# Patient Record
Sex: FEMALE | Race: WHITE | NOT HISPANIC OR LATINO | URBAN - METROPOLITAN AREA
[De-identification: names, ages, dates, MRNs, and addresses within clinical notes are randomized per-mention and may not be internally consistent; named-entity substitution may affect disease eponyms.]

---

## 2019-03-12 ENCOUNTER — INPATIENT (INPATIENT)
Facility: HOSPITAL | Age: 69
LOS: 0 days | Discharge: ANOTHER HOSPITAL | DRG: 305 | End: 2019-03-13
Attending: INTERNAL MEDICINE | Admitting: INTERNAL MEDICINE
Payer: MEDICARE

## 2019-03-12 VITALS
RESPIRATION RATE: 18 BRPM | SYSTOLIC BLOOD PRESSURE: 138 MMHG | DIASTOLIC BLOOD PRESSURE: 99 MMHG | HEIGHT: 66 IN | TEMPERATURE: 98 F | HEART RATE: 87 BPM | OXYGEN SATURATION: 98 % | WEIGHT: 169.98 LBS

## 2019-03-12 DIAGNOSIS — Z98.890 OTHER SPECIFIED POSTPROCEDURAL STATES: Chronic | ICD-10-CM

## 2019-03-12 DIAGNOSIS — I21.4 NON-ST ELEVATION (NSTEMI) MYOCARDIAL INFARCTION: ICD-10-CM

## 2019-03-12 LAB
ALBUMIN SERPL ELPH-MCNC: 3.8 G/DL — SIGNIFICANT CHANGE UP (ref 3.5–5)
ALP SERPL-CCNC: 91 U/L — SIGNIFICANT CHANGE UP (ref 40–120)
ALT FLD-CCNC: 25 U/L DA — SIGNIFICANT CHANGE UP (ref 10–60)
ANION GAP SERPL CALC-SCNC: 6 MMOL/L — SIGNIFICANT CHANGE UP (ref 5–17)
APTT BLD: 27.6 SEC — SIGNIFICANT CHANGE UP (ref 27.5–36.3)
AST SERPL-CCNC: 19 U/L — SIGNIFICANT CHANGE UP (ref 10–40)
BASOPHILS # BLD AUTO: 0.05 K/UL — SIGNIFICANT CHANGE UP (ref 0–0.2)
BASOPHILS NFR BLD AUTO: 0.7 % — SIGNIFICANT CHANGE UP (ref 0–2)
BILIRUB SERPL-MCNC: 0.2 MG/DL — SIGNIFICANT CHANGE UP (ref 0.2–1.2)
BUN SERPL-MCNC: 22 MG/DL — HIGH (ref 7–18)
CALCIUM SERPL-MCNC: 8.8 MG/DL — SIGNIFICANT CHANGE UP (ref 8.4–10.5)
CHLORIDE SERPL-SCNC: 109 MMOL/L — HIGH (ref 96–108)
CO2 SERPL-SCNC: 27 MMOL/L — SIGNIFICANT CHANGE UP (ref 22–31)
CREAT SERPL-MCNC: 0.99 MG/DL — SIGNIFICANT CHANGE UP (ref 0.5–1.3)
EOSINOPHIL # BLD AUTO: 0.18 K/UL — SIGNIFICANT CHANGE UP (ref 0–0.5)
EOSINOPHIL NFR BLD AUTO: 2.4 % — SIGNIFICANT CHANGE UP (ref 0–6)
GLUCOSE SERPL-MCNC: 96 MG/DL — SIGNIFICANT CHANGE UP (ref 70–99)
HCT VFR BLD CALC: 44.8 % — SIGNIFICANT CHANGE UP (ref 34.5–45)
HGB BLD-MCNC: 14.8 G/DL — SIGNIFICANT CHANGE UP (ref 11.5–15.5)
IMM GRANULOCYTES NFR BLD AUTO: 0.3 % — SIGNIFICANT CHANGE UP (ref 0–1.5)
INR BLD: 1.02 RATIO — SIGNIFICANT CHANGE UP (ref 0.88–1.16)
LYMPHOCYTES # BLD AUTO: 3.05 K/UL — SIGNIFICANT CHANGE UP (ref 1–3.3)
LYMPHOCYTES # BLD AUTO: 40.5 % — SIGNIFICANT CHANGE UP (ref 13–44)
MCHC RBC-ENTMCNC: 32.3 PG — SIGNIFICANT CHANGE UP (ref 27–34)
MCHC RBC-ENTMCNC: 33 GM/DL — SIGNIFICANT CHANGE UP (ref 32–36)
MCV RBC AUTO: 97.8 FL — SIGNIFICANT CHANGE UP (ref 80–100)
MONOCYTES # BLD AUTO: 0.66 K/UL — SIGNIFICANT CHANGE UP (ref 0–0.9)
MONOCYTES NFR BLD AUTO: 8.8 % — SIGNIFICANT CHANGE UP (ref 2–14)
NEUTROPHILS # BLD AUTO: 3.58 K/UL — SIGNIFICANT CHANGE UP (ref 1.8–7.4)
NEUTROPHILS NFR BLD AUTO: 47.3 % — SIGNIFICANT CHANGE UP (ref 43–77)
NRBC # BLD: 0 /100 WBCS — SIGNIFICANT CHANGE UP (ref 0–0)
PLATELET # BLD AUTO: 235 K/UL — SIGNIFICANT CHANGE UP (ref 150–400)
POTASSIUM SERPL-MCNC: 4.4 MMOL/L — SIGNIFICANT CHANGE UP (ref 3.5–5.3)
POTASSIUM SERPL-SCNC: 4.4 MMOL/L — SIGNIFICANT CHANGE UP (ref 3.5–5.3)
PROT SERPL-MCNC: 7.7 G/DL — SIGNIFICANT CHANGE UP (ref 6–8.3)
PROTHROM AB SERPL-ACNC: 11.3 SEC — SIGNIFICANT CHANGE UP (ref 10–12.9)
RBC # BLD: 4.58 M/UL — SIGNIFICANT CHANGE UP (ref 3.8–5.2)
RBC # FLD: 13.1 % — SIGNIFICANT CHANGE UP (ref 10.3–14.5)
SODIUM SERPL-SCNC: 142 MMOL/L — SIGNIFICANT CHANGE UP (ref 135–145)
TROPONIN I SERPL-MCNC: 0.81 NG/ML — HIGH (ref 0–0.04)
WBC # BLD: 7.54 K/UL — SIGNIFICANT CHANGE UP (ref 3.8–10.5)
WBC # FLD AUTO: 7.54 K/UL — SIGNIFICANT CHANGE UP (ref 3.8–10.5)

## 2019-03-12 PROCEDURE — 71046 X-RAY EXAM CHEST 2 VIEWS: CPT | Mod: 26

## 2019-03-12 PROCEDURE — 99285 EMERGENCY DEPT VISIT HI MDM: CPT

## 2019-03-12 RX ORDER — ASPIRIN/CALCIUM CARB/MAGNESIUM 324 MG
81 TABLET ORAL DAILY
Qty: 0 | Refills: 0 | Status: DISCONTINUED | OUTPATIENT
Start: 2019-03-12 | End: 2019-03-13

## 2019-03-12 RX ORDER — METOPROLOL TARTRATE 50 MG
12.5 TABLET ORAL
Qty: 0 | Refills: 0 | Status: DISCONTINUED | OUTPATIENT
Start: 2019-03-12 | End: 2019-03-13

## 2019-03-12 RX ORDER — SODIUM CHLORIDE 9 MG/ML
1000 INJECTION INTRAMUSCULAR; INTRAVENOUS; SUBCUTANEOUS
Qty: 0 | Refills: 0 | Status: COMPLETED | OUTPATIENT
Start: 2019-03-12 | End: 2019-03-12

## 2019-03-12 RX ORDER — ATORVASTATIN CALCIUM 80 MG/1
40 TABLET, FILM COATED ORAL AT BEDTIME
Qty: 0 | Refills: 0 | Status: DISCONTINUED | OUTPATIENT
Start: 2019-03-12 | End: 2019-03-13

## 2019-03-12 RX ORDER — ENOXAPARIN SODIUM 100 MG/ML
80 INJECTION SUBCUTANEOUS ONCE
Qty: 0 | Refills: 0 | Status: COMPLETED | OUTPATIENT
Start: 2019-03-12 | End: 2019-03-12

## 2019-03-12 RX ORDER — ASPIRIN/CALCIUM CARB/MAGNESIUM 324 MG
325 TABLET ORAL ONCE
Qty: 0 | Refills: 0 | Status: COMPLETED | OUTPATIENT
Start: 2019-03-12 | End: 2019-03-12

## 2019-03-12 RX ADMIN — ENOXAPARIN SODIUM 80 MILLIGRAM(S): 100 INJECTION SUBCUTANEOUS at 23:02

## 2019-03-12 RX ADMIN — ATORVASTATIN CALCIUM 40 MILLIGRAM(S): 80 TABLET, FILM COATED ORAL at 21:36

## 2019-03-12 RX ADMIN — Medication 325 MILLIGRAM(S): at 18:44

## 2019-03-12 RX ADMIN — SODIUM CHLORIDE 100 MILLILITER(S): 9 INJECTION INTRAMUSCULAR; INTRAVENOUS; SUBCUTANEOUS at 21:49

## 2019-03-12 NOTE — H&P ADULT - NSHPPHYSICALEXAM_GEN_ALL_CORE
Vital Signs Last 24 Hrs  T(C): 36.8 (12 Mar 2019 20:30), Max: 36.8 (12 Mar 2019 20:30)  T(F): 98.3 (12 Mar 2019 20:30), Max: 98.3 (12 Mar 2019 20:30)  HR: 65 (12 Mar 2019 20:30) (65 - 87)  BP: 130/71 (12 Mar 2019 20:30) (130/71 - 138/99)  RR: 18 (12 Mar 2019 20:30) (18 - 18)  SpO2: 97% (12 Mar 2019 20:30) (97% - 98%)

## 2019-03-12 NOTE — ED PROVIDER NOTE - PROGRESS NOTE DETAILS
patient trop +, endorses no cp at this time. vital stable. ekg wnl. admitted to dr willson for nstemi

## 2019-03-12 NOTE — H&P ADULT - NSICDXPROBLEM_GEN_ALL_CORE_FT
PROBLEM DIAGNOSES  Problem: Non-ST elevation MI (NSTEMI)  Assessment and Plan: Patient presented with  left side chest pain radiating to left arm along with SOB and exertional dyspnea   - on admission pt was afebrile, HD stable, no leucocytosis, normal electrolytes and renal functions  - CXR showed no congestion or consolidation   - Troponin T1 0.8, D dimer neg   -Ekg NSR with no st t wave changes   - trend trop   -F/u BNP, ECHO   - F/u CT chest   - s/p 80 U lovenox   - continue with aspirin, lipitor and BB  - f/u TSH, lipid panel and A1c   - cardilogy consult Dr Salmeron     Problem: HLD (hyperlipidemia)  Assessment and Plan:     Problem: Prophylactic measure  Assessment and Plan: VTE score 2   lovenox for Prophylaxis

## 2019-03-12 NOTE — ED PROVIDER NOTE - CLINICAL SUMMARY MEDICAL DECISION MAKING FREE TEXT BOX
67 y/o F presents to the ED with intermittent L-sided chest pain x 2 days. Otherwise vitals are stable, pt has low risk for PE, and has a low HEART score except for age. Will check labs, chest xray, and reassess.

## 2019-03-12 NOTE — H&P ADULT - MUSCULOSKELETAL
detailed exam ROM intact/no joint swelling/no joint erythema/no joint warmth/no calf tenderness/normal strength

## 2019-03-12 NOTE — H&P ADULT - ATTENDING COMMENTS
seen and examined  came for chest pain left side  radoiating to lt arm,  x 2 days , but constant, not related with exertion no sob or palp   pt never had this cp before  ,  not going to back   no h/o htn,dm.  h/o HLD, smoking.  no f/h of cad  no h/o travel, no leg pain    she has exertional dyspnea for  a while  vsstable  bp, pulse, rr , temp neted ok  no chest tenderness,   b/l pulses equivocal  cns non focal  labs noted   ekg nsr , cxr negative  trop 0.8  d dimer borderline  a/p r/o coronary event  lovenox, asa, metoprolol  card, troponon, telem  simvastatin  no evidence of PE or Aortic disection   heavy smoker  will get ct scan of chest

## 2019-03-12 NOTE — ED ADULT NURSE NOTE - OBJECTIVE STATEMENT
pt complains of on and off chest pain since Sunday. pt says she has sob when pain begins but that she dose not have any pain at the moment.

## 2019-03-12 NOTE — H&P ADULT - ASSESSMENT
Patient is a 68 year old female from home with PMHx of HLD, active smoker, vaginal cancer s/p surgery in 1996, presented to ED with C/o of chest pain since 2 days. As per patient she started having chest pain on Sunday, progressively getting worse. Patient describes chest pain as pressure like, 5/10 in intensity, located left side of chest, radiating to left arm no alleviating or relieving factors. patient also repots of dry cough, SOB and exertional dyspnea, Patient is active smoker, 1 PPD for 50 years. she denies fever, chills, headaches, palpitation, dizziness, sweating, nausea, vomiting, diarrhea, abdominal pain, dysuria, skin rashes, muscle or joint pain. Patient denies any H/o CVD or recent travel. Patient is a 68 year old female from home with PMHx of HLD, active smoker, vaginal cancer s/p surgery in 1996, presented to ED with C/o of chest pain since 2 days. As per patient she started having chest pain on Sunday, progressively getting worse. Patient describes chest pain as pressure like, 5/10 in intensity, located left side of chest, radiating to left arm no alleviating or relieving factors. patient also repots of dry cough, SOB and exertional dyspnea, Patient is active smoker, 1 PPD for 50 years. she denies fever, chills, headaches, palpitation, dizziness, sweating, nausea, vomiting, diarrhea, abdominal pain, dysuria, skin rashes, muscle or joint pain. Patient denies any H/o CVD or recent travel.     on admission pt was afebrile, HD stable, no leucocytosis, normal electrolytes and renal functions  - CXR showed no congestion or consolidation   - Troponin T1 0.8  -Ekg NSR with no st t wave changes

## 2019-03-12 NOTE — H&P ADULT - HISTORY OF PRESENT ILLNESS
Patient is a 68 year old female from home with PMHx of HLD, active smoker, vaginal cancer s/p surgery in 1996, presented to ED with C/o of chest pain since 2 days. As per patient she started having chest pain on Sunday, progressively getting worse. Patient describes chest pain as pressure like, 5/10 in intensity, located left side of chest, radiating to left arm no alleviating or relieving factors. patient also repots of dry cough, SOB and exertional dyspnea, Patient is active smoker, 1 PPD for 50 years. she denies fever, chills, headaches, palpitation, dizziness, sweating, nausea, vomiting, diarrhea, abdominal pain, dysuria, skin rashes, muscle or joint pain. Patient denies any H/o CVD or recent travel.

## 2019-03-12 NOTE — H&P ADULT - RS GEN PE MLT RESP DETAILS PC
no wheezes/no rhonchi/no rales/good air movement/breath sounds equal/no chest wall tenderness/respirations non-labored

## 2019-03-12 NOTE — ED PROVIDER NOTE - OBJECTIVE STATEMENT
69 y/o F with no significant PMHx and no significant PSHx presents to the ED with c/o intermittent L-sided chest pain x 2 days that has since resolved. Pt states he has no past medical problems and no hx of smoking. Pt denies SOB, recent travel, recent sick contacts, calf pain or swelling, fevers, chills, cough, or any other complaints. NKDA.

## 2019-03-13 ENCOUNTER — INPATIENT (INPATIENT)
Facility: HOSPITAL | Age: 69
LOS: 0 days | Discharge: HOME | End: 2019-03-14
Attending: INTERNAL MEDICINE | Admitting: INTERNAL MEDICINE
Payer: SELF-PAY

## 2019-03-13 VITALS
RESPIRATION RATE: 17 BRPM | HEART RATE: 68 BPM | SYSTOLIC BLOOD PRESSURE: 145 MMHG | TEMPERATURE: 99 F | OXYGEN SATURATION: 96 % | DIASTOLIC BLOOD PRESSURE: 78 MMHG

## 2019-03-13 VITALS
WEIGHT: 164.69 LBS | HEIGHT: 63 IN | HEART RATE: 75 BPM | SYSTOLIC BLOOD PRESSURE: 138 MMHG | DIASTOLIC BLOOD PRESSURE: 76 MMHG | OXYGEN SATURATION: 97 % | RESPIRATION RATE: 16 BRPM | TEMPERATURE: 98 F

## 2019-03-13 DIAGNOSIS — I21.4 NON-ST ELEVATION (NSTEMI) MYOCARDIAL INFARCTION: ICD-10-CM

## 2019-03-13 DIAGNOSIS — E78.5 HYPERLIPIDEMIA, UNSPECIFIED: ICD-10-CM

## 2019-03-13 DIAGNOSIS — Z98.890 OTHER SPECIFIED POSTPROCEDURAL STATES: Chronic | ICD-10-CM

## 2019-03-13 DIAGNOSIS — Z29.9 ENCOUNTER FOR PROPHYLACTIC MEASURES, UNSPECIFIED: ICD-10-CM

## 2019-03-13 LAB
ANION GAP SERPL CALC-SCNC: 5 MMOL/L — SIGNIFICANT CHANGE UP (ref 5–17)
BASOPHILS # BLD AUTO: 0.06 K/UL — SIGNIFICANT CHANGE UP (ref 0–0.2)
BASOPHILS NFR BLD AUTO: 0.8 % — SIGNIFICANT CHANGE UP (ref 0–2)
BUN SERPL-MCNC: 16 MG/DL — SIGNIFICANT CHANGE UP (ref 7–18)
CALCIUM SERPL-MCNC: 8.4 MG/DL — SIGNIFICANT CHANGE UP (ref 8.4–10.5)
CHLORIDE SERPL-SCNC: 110 MMOL/L — HIGH (ref 96–108)
CHOLEST SERPL-MCNC: 241 MG/DL — HIGH (ref 10–199)
CK MB BLD-MCNC: 3.4 % — SIGNIFICANT CHANGE UP (ref 0–3.5)
CK MB BLD-MCNC: 3.8 % — HIGH (ref 0–3.5)
CK MB BLD-MCNC: 8 NG/ML — HIGH (ref 1–4.7)
CK MB BLD-MCNC: SIGNIFICANT CHANGE UP (ref 0–2.5)
CK MB CFR SERPL CALC: 2.6 NG/ML — SIGNIFICANT CHANGE UP (ref 0–3.6)
CK MB CFR SERPL CALC: 2.9 NG/ML — SIGNIFICANT CHANGE UP (ref 0–3.6)
CK SERPL-CCNC: 113 U/L — SIGNIFICANT CHANGE UP (ref 25–170)
CK SERPL-CCNC: 76 U/L — SIGNIFICANT CHANGE UP (ref 21–215)
CK SERPL-CCNC: 77 U/L — SIGNIFICANT CHANGE UP (ref 21–215)
CO2 SERPL-SCNC: 23 MMOL/L — SIGNIFICANT CHANGE UP (ref 22–31)
CREAT SERPL-MCNC: 0.81 MG/DL — SIGNIFICANT CHANGE UP (ref 0.5–1.3)
EOSINOPHIL # BLD AUTO: 0.21 K/UL — SIGNIFICANT CHANGE UP (ref 0–0.5)
EOSINOPHIL NFR BLD AUTO: 2.8 % — SIGNIFICANT CHANGE UP (ref 0–6)
GLUCOSE SERPL-MCNC: 89 MG/DL — SIGNIFICANT CHANGE UP (ref 70–99)
HBA1C BLD-MCNC: 5.8 % — HIGH (ref 4–5.6)
HCT VFR BLD CALC: 43.7 % — SIGNIFICANT CHANGE UP (ref 34.5–45)
HCT VFR BLD CALC: 46.6 % — HIGH (ref 34.5–45)
HCV AB S/CO SERPL IA: 0.32 S/CO — SIGNIFICANT CHANGE UP (ref 0–0.79)
HCV AB SERPL-IMP: SIGNIFICANT CHANGE UP
HDLC SERPL-MCNC: 35 MG/DL — LOW
HGB BLD-MCNC: 14.8 G/DL — SIGNIFICANT CHANGE UP (ref 11.5–15.5)
HGB BLD-MCNC: 15.3 G/DL — SIGNIFICANT CHANGE UP (ref 11.5–15.5)
IMM GRANULOCYTES NFR BLD AUTO: 0.3 % — SIGNIFICANT CHANGE UP (ref 0–1.5)
LIPID PNL WITH DIRECT LDL SERPL: 166 MG/DL — SIGNIFICANT CHANGE UP
LYMPHOCYTES # BLD AUTO: 3.19 K/UL — SIGNIFICANT CHANGE UP (ref 1–3.3)
LYMPHOCYTES # BLD AUTO: 42.8 % — SIGNIFICANT CHANGE UP (ref 13–44)
MAGNESIUM SERPL-MCNC: 2.2 MG/DL — SIGNIFICANT CHANGE UP (ref 1.6–2.6)
MCHC RBC-ENTMCNC: 31.7 PG — SIGNIFICANT CHANGE UP (ref 27–34)
MCHC RBC-ENTMCNC: 32.7 PG — SIGNIFICANT CHANGE UP (ref 27–34)
MCHC RBC-ENTMCNC: 32.8 % — SIGNIFICANT CHANGE UP (ref 32–36)
MCHC RBC-ENTMCNC: 33.9 GM/DL — SIGNIFICANT CHANGE UP (ref 32–36)
MCV RBC AUTO: 96.5 FL — SIGNIFICANT CHANGE UP (ref 80–100)
MCV RBC AUTO: 96.5 FL — SIGNIFICANT CHANGE UP (ref 80–100)
MONOCYTES # BLD AUTO: 0.62 K/UL — SIGNIFICANT CHANGE UP (ref 0–0.9)
MONOCYTES NFR BLD AUTO: 8.3 % — SIGNIFICANT CHANGE UP (ref 2–14)
NEUTROPHILS # BLD AUTO: 3.35 K/UL — SIGNIFICANT CHANGE UP (ref 1.8–7.4)
NEUTROPHILS NFR BLD AUTO: 45 % — SIGNIFICANT CHANGE UP (ref 43–77)
NRBC # BLD: 0 /100 WBCS — SIGNIFICANT CHANGE UP (ref 0–0)
NRBC # FLD: 0 K/UL — LOW (ref 25–125)
PHOSPHATE SERPL-MCNC: 3.1 MG/DL — SIGNIFICANT CHANGE UP (ref 2.5–4.5)
PLATELET # BLD AUTO: 228 K/UL — SIGNIFICANT CHANGE UP (ref 150–400)
PLATELET # BLD AUTO: 239 K/UL — SIGNIFICANT CHANGE UP (ref 150–400)
PMV BLD: 10.7 FL — SIGNIFICANT CHANGE UP (ref 7–13)
POTASSIUM SERPL-MCNC: 3.8 MMOL/L — SIGNIFICANT CHANGE UP (ref 3.5–5.3)
POTASSIUM SERPL-SCNC: 3.8 MMOL/L — SIGNIFICANT CHANGE UP (ref 3.5–5.3)
RBC # BLD: 4.53 M/UL — SIGNIFICANT CHANGE UP (ref 3.8–5.2)
RBC # BLD: 4.83 M/UL — SIGNIFICANT CHANGE UP (ref 3.8–5.2)
RBC # FLD: 12.9 % — SIGNIFICANT CHANGE UP (ref 10.3–14.5)
RBC # FLD: 13.2 % — SIGNIFICANT CHANGE UP (ref 10.3–14.5)
SODIUM SERPL-SCNC: 138 MMOL/L — SIGNIFICANT CHANGE UP (ref 135–145)
TOTAL CHOLESTEROL/HDL RATIO MEASUREMENT: 6.9 RATIO — SIGNIFICANT CHANGE UP (ref 3.3–7.1)
TRIGL SERPL-MCNC: 200 MG/DL — HIGH (ref 10–149)
TROPONIN I SERPL-MCNC: 1.04 NG/ML — HIGH (ref 0–0.04)
TROPONIN I SERPL-MCNC: 1.13 NG/ML — HIGH (ref 0–0.04)
TROPONIN T, HIGH SENSITIVITY: 366 NG/L — CRITICAL HIGH (ref ?–14)
TSH SERPL-MCNC: 0.52 UU/ML — SIGNIFICANT CHANGE UP (ref 0.34–4.82)
WBC # BLD: 7.45 K/UL — SIGNIFICANT CHANGE UP (ref 3.8–10.5)
WBC # BLD: 8.9 K/UL — SIGNIFICANT CHANGE UP (ref 3.8–10.5)
WBC # FLD AUTO: 7.45 K/UL — SIGNIFICANT CHANGE UP (ref 3.8–10.5)
WBC # FLD AUTO: 8.9 K/UL — SIGNIFICANT CHANGE UP (ref 3.8–10.5)

## 2019-03-13 PROCEDURE — 80061 LIPID PANEL: CPT

## 2019-03-13 PROCEDURE — 93458 L HRT ARTERY/VENTRICLE ANGIO: CPT | Mod: 26,59

## 2019-03-13 PROCEDURE — 82550 ASSAY OF CK (CPK): CPT

## 2019-03-13 PROCEDURE — 86803 HEPATITIS C AB TEST: CPT

## 2019-03-13 PROCEDURE — 82553 CREATINE MB FRACTION: CPT

## 2019-03-13 PROCEDURE — 85027 COMPLETE CBC AUTOMATED: CPT

## 2019-03-13 PROCEDURE — 83036 HEMOGLOBIN GLYCOSYLATED A1C: CPT

## 2019-03-13 PROCEDURE — 92928 PRQ TCAT PLMT NTRAC ST 1 LES: CPT | Mod: RC

## 2019-03-13 PROCEDURE — 83735 ASSAY OF MAGNESIUM: CPT

## 2019-03-13 PROCEDURE — 85379 FIBRIN DEGRADATION QUANT: CPT

## 2019-03-13 PROCEDURE — 80048 BASIC METABOLIC PNL TOTAL CA: CPT

## 2019-03-13 PROCEDURE — 85730 THROMBOPLASTIN TIME PARTIAL: CPT

## 2019-03-13 PROCEDURE — 92978 ENDOLUMINL IVUS OCT C 1ST: CPT | Mod: 26,RC

## 2019-03-13 PROCEDURE — 80053 COMPREHEN METABOLIC PANEL: CPT

## 2019-03-13 PROCEDURE — 84484 ASSAY OF TROPONIN QUANT: CPT

## 2019-03-13 PROCEDURE — 99285 EMERGENCY DEPT VISIT HI MDM: CPT | Mod: 25

## 2019-03-13 PROCEDURE — 84443 ASSAY THYROID STIM HORMONE: CPT

## 2019-03-13 PROCEDURE — 93010 ELECTROCARDIOGRAM REPORT: CPT

## 2019-03-13 PROCEDURE — 99152 MOD SED SAME PHYS/QHP 5/>YRS: CPT

## 2019-03-13 PROCEDURE — 93005 ELECTROCARDIOGRAM TRACING: CPT

## 2019-03-13 PROCEDURE — 84100 ASSAY OF PHOSPHORUS: CPT

## 2019-03-13 PROCEDURE — 71046 X-RAY EXAM CHEST 2 VIEWS: CPT

## 2019-03-13 PROCEDURE — 36415 COLL VENOUS BLD VENIPUNCTURE: CPT

## 2019-03-13 PROCEDURE — 85610 PROTHROMBIN TIME: CPT

## 2019-03-13 RX ORDER — METOPROLOL TARTRATE 50 MG
12.5 TABLET ORAL
Qty: 0 | Refills: 0 | Status: DISCONTINUED | OUTPATIENT
Start: 2019-03-13 | End: 2019-03-14

## 2019-03-13 RX ORDER — NICOTINE POLACRILEX 2 MG
1 GUM BUCCAL DAILY
Qty: 0 | Refills: 0 | Status: DISCONTINUED | OUTPATIENT
Start: 2019-03-13 | End: 2019-03-14

## 2019-03-13 RX ORDER — TICAGRELOR 90 MG/1
90 TABLET ORAL
Qty: 0 | Refills: 0 | Status: DISCONTINUED | OUTPATIENT
Start: 2019-03-14 | End: 2019-03-14

## 2019-03-13 RX ORDER — ATORVASTATIN CALCIUM 80 MG/1
80 TABLET, FILM COATED ORAL AT BEDTIME
Qty: 0 | Refills: 0 | Status: DISCONTINUED | OUTPATIENT
Start: 2019-03-13 | End: 2019-03-14

## 2019-03-13 RX ORDER — ACETAMINOPHEN 500 MG
650 TABLET ORAL ONCE
Qty: 0 | Refills: 0 | Status: COMPLETED | OUTPATIENT
Start: 2019-03-13 | End: 2019-03-13

## 2019-03-13 RX ORDER — ASPIRIN/CALCIUM CARB/MAGNESIUM 324 MG
81 TABLET ORAL DAILY
Qty: 0 | Refills: 0 | Status: DISCONTINUED | OUTPATIENT
Start: 2019-03-14 | End: 2019-03-14

## 2019-03-13 RX ORDER — SODIUM CHLORIDE 9 MG/ML
3 INJECTION INTRAMUSCULAR; INTRAVENOUS; SUBCUTANEOUS EVERY 8 HOURS
Qty: 0 | Refills: 0 | Status: DISCONTINUED | OUTPATIENT
Start: 2019-03-13 | End: 2019-03-14

## 2019-03-13 RX ORDER — EPTIFIBATIDE 2 MG/ML
2 INJECTION, SOLUTION INTRAVENOUS
Qty: 75 | Refills: 0 | Status: DISCONTINUED | OUTPATIENT
Start: 2019-03-13 | End: 2019-03-14

## 2019-03-13 RX ORDER — LANOLIN ALCOHOL/MO/W.PET/CERES
3 CREAM (GRAM) TOPICAL ONCE
Qty: 0 | Refills: 0 | Status: COMPLETED | OUTPATIENT
Start: 2019-03-13 | End: 2019-03-13

## 2019-03-13 RX ADMIN — EPTIFIBATIDE 12.8 MICROGRAM(S)/KG/MIN: 2 INJECTION, SOLUTION INTRAVENOUS at 21:06

## 2019-03-13 RX ADMIN — SODIUM CHLORIDE 3 MILLILITER(S): 9 INJECTION INTRAMUSCULAR; INTRAVENOUS; SUBCUTANEOUS at 21:00

## 2019-03-13 RX ADMIN — Medication 3 MILLIGRAM(S): at 21:27

## 2019-03-13 RX ADMIN — Medication 12.5 MILLIGRAM(S): at 21:05

## 2019-03-13 RX ADMIN — Medication 650 MILLIGRAM(S): at 21:06

## 2019-03-13 RX ADMIN — Medication 1 PATCH: at 21:08

## 2019-03-13 RX ADMIN — ATORVASTATIN CALCIUM 80 MILLIGRAM(S): 80 TABLET, FILM COATED ORAL at 21:06

## 2019-03-13 RX ADMIN — Medication 12.5 MILLIGRAM(S): at 06:57

## 2019-03-13 RX ADMIN — Medication 81 MILLIGRAM(S): at 11:10

## 2019-03-13 RX ADMIN — EPTIFIBATIDE 12.8 MICROGRAM(S)/KG/MIN: 2 INJECTION, SOLUTION INTRAVENOUS at 15:12

## 2019-03-13 RX ADMIN — Medication 650 MILLIGRAM(S): at 22:00

## 2019-03-13 RX ADMIN — EPTIFIBATIDE 12.8 MICROGRAM(S)/KG/MIN: 2 INJECTION, SOLUTION INTRAVENOUS at 15:11

## 2019-03-13 RX ADMIN — EPTIFIBATIDE 12.8 MICROGRAM(S)/KG/MIN: 2 INJECTION, SOLUTION INTRAVENOUS at 15:10

## 2019-03-13 NOTE — TRANSFER ACCEPTANCE NOTE - NEGATIVE NEUROLOGICAL SYMPTOMS
no headache/no difficulty walking/no loss of consciousness/no facial palsy/no hemiparesis/no transient paralysis/no weakness/no syncope/no tremors/no focal seizures/no vertigo/no loss of sensation/no paresthesias/no generalized seizures/no confusion

## 2019-03-13 NOTE — ACUTE INTERFACILITY TRANSFER NOTE - HOSPITAL COURSE
Patient is a 68 year old female from home with PMHx of HLD, active smoker, vaginal cancer s/p surgery in 1996, presented to ED with C/o of chest pain since 2 days.Patient is active smoker, 1 PPD for 50 years.   On admission pt was afebrile, HD stable, no leucocytosis, normal electrolytes and renal functions  - CXR showed no congestion or consolidation   -Ekg NSR with no st t wave changes   CE trended up 0.8-->1.130-->1.040   D dimer -ve   S/p lovenox 80 mg x1   Cholesterol and TG high     Pt will be transferred for cardiac cath to [facility]

## 2019-03-13 NOTE — PROGRESS NOTE ADULT - SUBJECTIVE AND OBJECTIVE BOX
Right radial band removed. No hematoma or active bleeding   + Pulse DSD applied VSS.  Continue Integrilin gtt 18 hrs

## 2019-03-13 NOTE — CONSULT NOTE ADULT - ASSESSMENT
69 y/o F w/ no known PMH not on any meds, active smoker, and vaginal cancer s/p surgery in 1996 presented to [facility] ED with c/o of chest pain. Admitted for NSTEMI s/p cardiac cath with STEPHON to RCA.     #NSTEMI s/p STEPHON to RCA w/ heavy thrombus burden.   - Start ASA and Brilinta.   - Start Atorvastatin 80 mg daily  - C/w integrelin till 10 AM on 3/14/19  - Monitor Platelets.   - Check TTE    Heparin SQ for DVT ppx.     Nimisha Zuniga MD  Cardiology Fellow - PGY-4  [facility]: [id number]  NS: [phone]  [id number]

## 2019-03-13 NOTE — TRANSFER ACCEPTANCE NOTE - RS GEN PE MLT RESP DETAILS PC
respirations non-labored/breath sounds equal/no chest wall tenderness/airway patent/good air movement/clear to auscultation bilaterally

## 2019-03-13 NOTE — TRANSFER ACCEPTANCE NOTE - HISTORY OF PRESENT ILLNESS
67 y/o F w/ PMH HLD, active smoker and vaginal cancer s/p surgery in 1996 presented to [facility] ED with c/o of chest pain. As per patient she started having chest pain on Sunday, progressively getting worse. Patient describes chest pain as pressure like, 5/10 in intensity, located left side of chest, radiating to left arm no alleviating or relieving factors. Patient also reports of dry cough, SOB and exertional dyspnea, Patient is active smoker, 1 PPD for 50 years. Pt was found to have elevated troponins which trended 0.808 -> 1.13 -> 1.04. Pt is now transferred to [facility] for cardiac catheretization.

## 2019-03-13 NOTE — TRANSFER ACCEPTANCE NOTE - NEGATIVE MUSCULOSKELETAL SYMPTOMS
no muscle cramps/no muscle weakness/no arthralgia/no arthritis/no back pain/no joint swelling/no myalgia/no neck pain/no stiffness

## 2019-03-13 NOTE — CONSULT NOTE ADULT - SUBJECTIVE AND OBJECTIVE BOX
Patient seen and evaluated at bedside    Chief Complaint: Chest Pain.     HPI:  67 y/o F w/ no known PMH not on any meds, active smoker, and vaginal cancer s/p surgery in  presented to [facility] ED with c/o of chest pain. As per patient she started having chest pain on , progressively getting worse. Patient describes chest pain as pressure like, 5/10 in intensity, located left side of chest, radiating to left arm no alleviating or relieving factors. PPD for 50 years. Pt was found to have elevated troponin which trended 0.808 -> 1.13 -> 1.04. The pt was transferred to [facility] for cardiac catheretization. The patient was found to have a 99% RCA lesion s/p STEPHON x 1. There was heavy thrombus burden as well as possible narrowing of an acute marginal so the patient was started on an integrelin gtt and transferred to the CCU.       PMHx:   Smoker  Vaginal cancer  HLD (hyperlipidemia)  No pertinent past medical history      PSHx:   H/O vaginal surgery  No significant past surgical history      Allergies:  No Known Allergies      Home Meds: None     Current Medications:   atorvastatin 80 milliGRAM(s) Oral at bedtime  metoprolol tartrate 12.5 milliGRAM(s) Oral two times a day  sodium chloride 0.9% lock flush 3 milliLiter(s) IV Push every 8 hours      FAMILY HISTORY: Sister with high BP. Mom day and son are dead.       Social History: Lives with sister.   Smoking History: Current daily smoker   Alcohol Use: none  Drug Use: none     REVIEW OF SYSTEMS:  Constitutional:     [ ] negative [ ] fevers [ ] chills [ ] weight loss [ ] weight gain  HEENT:                  [ ] negative [ ] dry eyes [ ] eye irritation [ ] postnasal drip [ ] nasal congestion  CV:                         [ ] negative  [ x] chest pain [ ] orthopnea [ ] palpitations [ ] murmur  Resp:                     [ ] negative [ ] cough [ ] shortness of breath [ ] dyspnea [ ] wheezing [ ] sputum [ ]hemoptysis  GI:                          [ ] negative [ ] nausea [ ] vomiting [ ] diarrhea [ ] constipation [ ] abd pain [ ] dysphagia   :                        [ ] negative [ ] dysuria [ ] nocturia [ ] hematuria [ ] increased urinary frequency  Musculoskeletal: [ ] negative [ ] back pain [ ] myalgias [ ] arthralgias [ ] fracture  Skin:                       [ ] negative [ ] rash [ ] itch  Neurological:        [ ] negative [ ] headache [ ] dizziness [ ] syncope [ ] weakness [ ] numbness  Psychiatric:           [ ] negative [ ] anxiety [ ] depression  Endocrine:            [ ] negative [ ] diabetes [ ] thyroid problem  Heme/Lymph:      [ ] negative [ ] anemia [ ] bleeding problem  Allergic/Immune: [ ] negative [ ] itchy eyes [ ] nasal discharge [ ] hives [ ] angioedema    [x ] All other systems negative  [ ] Unable to assess ROS because sedated with anoxic brain injury.      Physical Exam:  T(F): 98.6 (), Max: 98.6 ()  HR: 68 () (54 - 87)  BP: 145/78 () (104/62 - 145/78)  RR: 17 ()  SpO2: 96% ()  GENERAL: No acute distress, well-developed  HEAD:  Atraumatic, Normocephalic  ENT: EOMI, PERRLA, conjunctiva and sclera clear, Neck supple, No JVD, moist mucosa  CHEST/LUNG: Clear to auscultation bilaterally; No wheeze, equal breath sounds bilaterally   BACK: No spinal tenderness  HEART: Regular rate and rhythm; No murmurs, rubs, or gallops  ABDOMEN: Soft, Nontender, Nondistended; Bowel sounds present  EXTREMITIES:  No clubbing, cyanosis, or edema  PSYCH: Nl behavior, nl affect  NEUROLOGY: AAOx3, non-focal, cranial nerves intact  SKIN: Normal color, No rashes or lesions  Right radial band in place.     Cardiovascular Diagnostic Testing:    Imaging:    CXR: Personally reviewed    Labs: Personally reviewed                        14.8   7.45  )-----------( 228      ( 13 Mar 2019 06:44 )             43.7         138  |  110<H>  |  16  ----------------------------<  89  3.8   |  23  |  0.81    Ca    8.4      13 Mar 2019 06:44  Phos  3.1       Mg     2.2         TPro  7.7  /  Alb  3.8  /  TBili  0.2  /  DBili  x   /  AST  19  /  ALT  25  /  AlkPhos  91      PT/INR - ( 12 Mar 2019 18:43 )   PT: 11.3 sec;   INR: 1.02 ratio         PTT - ( 12 Mar 2019 18:43 )  PTT:27.6 sec    Total Cholesterol: 241  LDL: 166  HDL: 35  T    Hemoglobin A1C, Whole Blood: 5.8 % ( @ 09:37)    Thyroid Stimulating Hormone, Serum: 0.52 uU/mL ( @ 06:44)

## 2019-03-13 NOTE — TRANSFER ACCEPTANCE NOTE - ASSESSMENT
69 y/o F w/ PMH HLD, active smoker and vaginal cancer s/p surgery in 1996 presented to [facility] ED with c/o of chest pain    + NSTEMI

## 2019-03-13 NOTE — ACUTE INTERFACILITY TRANSFER NOTE - PLAN OF CARE
prevent future episodes You presented with Chest pain. ECG was normal but your heart enzymes trended up so will  need cardiac catheterisation as per cardiologist . So you will be transferred to [facility] You have history of HLD. Your cholesterol and Triglyceride levels were high   Continue with Statins Pt needs CT chest no contrast to rule out lung disease as she is a active smoker

## 2019-03-13 NOTE — TRANSFER ACCEPTANCE NOTE - NEGATIVE GASTROINTESTINAL SYMPTOMS
no abdominal pain/no diarrhea/no vomiting/no constipation/no change in bowel habits/no melena/no nausea

## 2019-03-14 VITALS — DIASTOLIC BLOOD PRESSURE: 60 MMHG | HEART RATE: 86 BPM | SYSTOLIC BLOOD PRESSURE: 121 MMHG | RESPIRATION RATE: 24 BRPM

## 2019-03-14 LAB
ALBUMIN SERPL ELPH-MCNC: 4 G/DL — SIGNIFICANT CHANGE UP (ref 3.3–5)
ALP SERPL-CCNC: 79 U/L — SIGNIFICANT CHANGE UP (ref 40–120)
ALT FLD-CCNC: 16 U/L — SIGNIFICANT CHANGE UP (ref 4–33)
ANION GAP SERPL CALC-SCNC: 13 MMO/L — SIGNIFICANT CHANGE UP (ref 7–14)
AST SERPL-CCNC: 35 U/L — HIGH (ref 4–32)
BILIRUB SERPL-MCNC: 0.5 MG/DL — SIGNIFICANT CHANGE UP (ref 0.2–1.2)
BUN SERPL-MCNC: 17 MG/DL — SIGNIFICANT CHANGE UP (ref 7–23)
CALCIUM SERPL-MCNC: 9.2 MG/DL — SIGNIFICANT CHANGE UP (ref 8.4–10.5)
CHLORIDE SERPL-SCNC: 103 MMOL/L — SIGNIFICANT CHANGE UP (ref 98–107)
CK MB BLD-MCNC: 24.37 NG/ML — HIGH (ref 1–4.7)
CK MB BLD-MCNC: 26.73 NG/ML — HIGH (ref 1–4.7)
CK MB BLD-MCNC: 9 — HIGH (ref 0–2.5)
CK MB BLD-MCNC: 9.6 — HIGH (ref 0–2.5)
CK SERPL-CCNC: 270 U/L — HIGH (ref 25–170)
CK SERPL-CCNC: 278 U/L — HIGH (ref 25–170)
CO2 SERPL-SCNC: 25 MMOL/L — SIGNIFICANT CHANGE UP (ref 22–31)
CREAT SERPL-MCNC: 0.96 MG/DL — SIGNIFICANT CHANGE UP (ref 0.5–1.3)
GLUCOSE SERPL-MCNC: 95 MG/DL — SIGNIFICANT CHANGE UP (ref 70–99)
HCT VFR BLD CALC: 45.7 % — HIGH (ref 34.5–45)
HCT VFR BLD CALC: 46.1 % — HIGH (ref 34.5–45)
HCT VFR BLD CALC: 47.8 % — HIGH (ref 34.5–45)
HGB BLD-MCNC: 14.9 G/DL — SIGNIFICANT CHANGE UP (ref 11.5–15.5)
HGB BLD-MCNC: 14.9 G/DL — SIGNIFICANT CHANGE UP (ref 11.5–15.5)
HGB BLD-MCNC: 16 G/DL — HIGH (ref 11.5–15.5)
MAGNESIUM SERPL-MCNC: 2.2 MG/DL — SIGNIFICANT CHANGE UP (ref 1.6–2.6)
MCHC RBC-ENTMCNC: 31.6 PG — SIGNIFICANT CHANGE UP (ref 27–34)
MCHC RBC-ENTMCNC: 31.8 PG — SIGNIFICANT CHANGE UP (ref 27–34)
MCHC RBC-ENTMCNC: 32.2 PG — SIGNIFICANT CHANGE UP (ref 27–34)
MCHC RBC-ENTMCNC: 32.3 % — SIGNIFICANT CHANGE UP (ref 32–36)
MCHC RBC-ENTMCNC: 32.6 % — SIGNIFICANT CHANGE UP (ref 32–36)
MCHC RBC-ENTMCNC: 33.5 % — SIGNIFICANT CHANGE UP (ref 32–36)
MCV RBC AUTO: 96.2 FL — SIGNIFICANT CHANGE UP (ref 80–100)
MCV RBC AUTO: 97.4 FL — SIGNIFICANT CHANGE UP (ref 80–100)
MCV RBC AUTO: 97.9 FL — SIGNIFICANT CHANGE UP (ref 80–100)
NRBC # FLD: 0 K/UL — LOW (ref 25–125)
PHOSPHATE SERPL-MCNC: 3.8 MG/DL — SIGNIFICANT CHANGE UP (ref 2.5–4.5)
PLATELET # BLD AUTO: 216 K/UL — SIGNIFICANT CHANGE UP (ref 150–400)
PLATELET # BLD AUTO: 224 K/UL — SIGNIFICANT CHANGE UP (ref 150–400)
PLATELET # BLD AUTO: 245 K/UL — SIGNIFICANT CHANGE UP (ref 150–400)
PMV BLD: 10.7 FL — SIGNIFICANT CHANGE UP (ref 7–13)
PMV BLD: 11 FL — SIGNIFICANT CHANGE UP (ref 7–13)
PMV BLD: 11.1 FL — SIGNIFICANT CHANGE UP (ref 7–13)
POTASSIUM SERPL-MCNC: 4.6 MMOL/L — SIGNIFICANT CHANGE UP (ref 3.5–5.3)
POTASSIUM SERPL-SCNC: 4.6 MMOL/L — SIGNIFICANT CHANGE UP (ref 3.5–5.3)
PROT SERPL-MCNC: 6.8 G/DL — SIGNIFICANT CHANGE UP (ref 6–8.3)
RBC # BLD: 4.69 M/UL — SIGNIFICANT CHANGE UP (ref 3.8–5.2)
RBC # BLD: 4.71 M/UL — SIGNIFICANT CHANGE UP (ref 3.8–5.2)
RBC # BLD: 4.97 M/UL — SIGNIFICANT CHANGE UP (ref 3.8–5.2)
RBC # FLD: 12.8 % — SIGNIFICANT CHANGE UP (ref 10.3–14.5)
RBC # FLD: 13 % — SIGNIFICANT CHANGE UP (ref 10.3–14.5)
RBC # FLD: 13 % — SIGNIFICANT CHANGE UP (ref 10.3–14.5)
SODIUM SERPL-SCNC: 141 MMOL/L — SIGNIFICANT CHANGE UP (ref 135–145)
TROPONIN T, HIGH SENSITIVITY: 583 NG/L — CRITICAL HIGH (ref ?–14)
TROPONIN T, HIGH SENSITIVITY: 728 NG/L — CRITICAL HIGH (ref ?–14)
WBC # BLD: 8.56 K/UL — SIGNIFICANT CHANGE UP (ref 3.8–10.5)
WBC # BLD: 8.96 K/UL — SIGNIFICANT CHANGE UP (ref 3.8–10.5)
WBC # BLD: 9.4 K/UL — SIGNIFICANT CHANGE UP (ref 3.8–10.5)
WBC # FLD AUTO: 8.56 K/UL — SIGNIFICANT CHANGE UP (ref 3.8–10.5)
WBC # FLD AUTO: 8.96 K/UL — SIGNIFICANT CHANGE UP (ref 3.8–10.5)
WBC # FLD AUTO: 9.4 K/UL — SIGNIFICANT CHANGE UP (ref 3.8–10.5)

## 2019-03-14 PROCEDURE — 93306 TTE W/DOPPLER COMPLETE: CPT | Mod: 26

## 2019-03-14 PROCEDURE — 99239 HOSP IP/OBS DSCHRG MGMT >30: CPT

## 2019-03-14 RX ORDER — TICAGRELOR 90 MG/1
1 TABLET ORAL
Qty: 60 | Refills: 0 | OUTPATIENT
Start: 2019-03-14 | End: 2019-04-12

## 2019-03-14 RX ORDER — LISINOPRIL 2.5 MG/1
1 TABLET ORAL
Qty: 30 | Refills: 0 | OUTPATIENT
Start: 2019-03-14 | End: 2019-04-12

## 2019-03-14 RX ORDER — LANOLIN ALCOHOL/MO/W.PET/CERES
6 CREAM (GRAM) TOPICAL AT BEDTIME
Qty: 0 | Refills: 0 | Status: DISCONTINUED | OUTPATIENT
Start: 2019-03-14 | End: 2019-03-14

## 2019-03-14 RX ORDER — NICOTINE POLACRILEX 2 MG
1 GUM BUCCAL
Qty: 30 | Refills: 0 | OUTPATIENT
Start: 2019-03-14 | End: 2019-04-12

## 2019-03-14 RX ORDER — ATORVASTATIN CALCIUM 80 MG/1
1 TABLET, FILM COATED ORAL
Qty: 30 | Refills: 0 | OUTPATIENT
Start: 2019-03-14 | End: 2019-04-12

## 2019-03-14 RX ORDER — METOPROLOL TARTRATE 50 MG
1 TABLET ORAL
Qty: 30 | Refills: 0 | OUTPATIENT
Start: 2019-03-14 | End: 2019-04-12

## 2019-03-14 RX ORDER — LISINOPRIL 2.5 MG/1
2.5 TABLET ORAL DAILY
Qty: 0 | Refills: 0 | Status: DISCONTINUED | OUTPATIENT
Start: 2019-03-14 | End: 2019-03-14

## 2019-03-14 RX ORDER — HEPARIN SODIUM 5000 [USP'U]/ML
5000 INJECTION INTRAVENOUS; SUBCUTANEOUS EVERY 8 HOURS
Qty: 0 | Refills: 0 | Status: DISCONTINUED | OUTPATIENT
Start: 2019-03-14 | End: 2019-03-14

## 2019-03-14 RX ORDER — ASPIRIN/CALCIUM CARB/MAGNESIUM 324 MG
1 TABLET ORAL
Qty: 30 | Refills: 0 | OUTPATIENT
Start: 2019-03-14 | End: 2019-04-12

## 2019-03-14 RX ADMIN — SODIUM CHLORIDE 3 MILLILITER(S): 9 INJECTION INTRAMUSCULAR; INTRAVENOUS; SUBCUTANEOUS at 14:52

## 2019-03-14 RX ADMIN — TICAGRELOR 90 MILLIGRAM(S): 90 TABLET ORAL at 06:11

## 2019-03-14 RX ADMIN — Medication 12.5 MILLIGRAM(S): at 06:11

## 2019-03-14 RX ADMIN — SODIUM CHLORIDE 3 MILLILITER(S): 9 INJECTION INTRAMUSCULAR; INTRAVENOUS; SUBCUTANEOUS at 06:02

## 2019-03-14 RX ADMIN — LISINOPRIL 2.5 MILLIGRAM(S): 2.5 TABLET ORAL at 11:50

## 2019-03-14 RX ADMIN — Medication 1 PATCH: at 11:50

## 2019-03-14 RX ADMIN — Medication 1 PATCH: at 06:13

## 2019-03-14 RX ADMIN — Medication 81 MILLIGRAM(S): at 11:50

## 2019-03-14 NOTE — DISCHARGE NOTE NURSING/CASE MANAGEMENT/SOCIAL WORK - NSDCDPATPORTLINK_GEN_ALL_CORE
You can access the [facility] Portal, offered by [facility], by registering with the following website: [email or web address]

## 2019-03-14 NOTE — PROGRESS NOTE ADULT - ASSESSMENT
67 y/o F w/ no known PMH not on any meds, active smoker, and vaginal cancer s/p surgery in 1996 presented to [facility] ED with c/o of chest pain. Admitted for NSTEMI s/p cardiac cath with STEPHON to RCA.     Neuro  - no active issues, pt aox3    CV  #NSTEMI s/p STEPHON to RCA w/ heavy thrombus burden.   - c/w ASA and Brilinta.   - c/w Atorvastatin 80 mg daily  - C/w integrelin till 10 AM on 3/14/19  - q6 CBC to Monitor Platelets--stable so far  - Check TTE  - CE continuing to rise--will trend to peak    Pulm  - active smoker  - no active pulm issues    GI  - no active issues    Renal  no active issues    Heme  - continue to monitor platelets while on integrilin    Endo  - prediabetic, a1c 5.8  - consistent carb diet    ID  No active issues    Prophylaxis  DVT: will start sqh  Diet CC

## 2019-03-14 NOTE — DISCHARGE NOTE PROVIDER - CARE PROVIDER_API CALL
Elba Holdre)  Cardiology; Internal Medicine  [address] [address] [place] [address], [address]  [place], [place] [id number]  Phone: [phone]  Fax: [phone]  Follow Up Time:

## 2019-03-14 NOTE — PROGRESS NOTE ADULT - ATTENDING COMMENTS
Patient seen and examined  Patient with NSTEMI and PCI-->RCA  Continue DAPT and high intensity statin  Will check CBC at 3 PM  Await TTE  DC planning

## 2019-03-14 NOTE — DISCHARGE NOTE PROVIDER - HOSPITAL COURSE
69 y/o F w/ no known PMH not on any meds, active smoker, and vaginal cancer s/p surgery in 1996 presented to [facility] ED with c/o of chest pain.  The pt was transferred to [facility] for cardiac catheretization. The patient was found to have a 99% RCA lesion s/p STEPHON x 1. There was heavy thrombus burden as well as possible narrowing of an acute marginal so the patient was started on an integrelin gtt and transferred to the CCU. 69 y/o F w/ no known PMH not on any meds, active smoker, and vaginal cancer s/p surgery in 1996 presented to [facility] ED with c/o of chest pain.  The pt was transferred to [facility] for cardiac catheretization. The patient was found to have a 99% RCA lesion s/p STEPHON x 1. There was heavy thrombus burden as well as narrowing of an acute marginal so the patient was started on an integrelin gtt and transferred to the CCU. Integrelin was stopped after 18 hours. Platelets were stable x3 sets of CBC post integrelin. Pt was loaded with ASA and Brilinta. Post cath, she was also started on metoprolol, lisinopril, and atorvastatin. Given her smoking history, she was given a nicotine patch and prescription for the patch outpatient.         Of note, pt did not have medication insurance. Medications were sent to [facility] at [facility] and pt will be provided with coupons and resources to obtain medications. She is will to pay the cost for the first month's supply. She is to follow up with Dr. Holder within 1 to 2 weeks.

## 2019-03-14 NOTE — PROGRESS NOTE ADULT - ASSESSMENT
69 y/o F w/ no known PMH not on any meds, active smoker, and vaginal cancer s/p surgery in 1996 presented to [facility] ED with c/o of chest pain. Admitted for NSTEMI s/p cardiac cath with STEPHON to RCA.     Neuro  - no active issues, pt aox3    CV  #NSTEMI s/p STEPHON to RCA w/ heavy thrombus burden.   - c/w ASA and Brilinta.   - c/w Atorvastatin 80 mg daily  - C/w integrelin till 10 AM on 3/14/19  - q6 CBC to Monitor Platelets--stable so far  - Check TTE  - CE continuing to rise--will trend to peak    Pulm  - active smoker  - no active pulm issues    GI  - no active issues    Renal  no active issues    Heme  - continue to monitor platelets while on integrilin    Endo  - prediabetic, a1c 5.8  - consistent carb diet    ID  No active issues    Prophylaxis  DVT: will start sqh  Diet CC

## 2019-03-14 NOTE — DISCHARGE NOTE PROVIDER - NSDCCPCAREPLAN_GEN_ALL_CORE_FT
PRINCIPAL DISCHARGE DIAGNOSIS  Diagnosis: Non-ST elevation (NSTEMI) myocardial infarction  Assessment and Plan of Treatment: You have blockages in the blood vessels of your heart.. You underwent an angiogram and a drug eluting stent was placed in one of your arteries. Please continue to take your aspirin, brilinta (ticagrelor), atorvastatin, and lisinopril and follow up with your cardiologist within 1-2 weeks.

## 2019-03-14 NOTE — PROGRESS NOTE ADULT - SUBJECTIVE AND OBJECTIVE BOX
Patient is a 68y old  Female who presents with a chief complaint of chest pain     SUBJECTIVE / OVERNIGHT EVENTS:  Patient seen and examined at bedside. This morning, she is resting comfortably in bed and reports no new issues or overnight events.     She reports:  [  ] CP  [  ] SOB  [  ] Fever  [  ] N /  [  ] V  [  ] Diarrhea  [X] None of the above    Other Review of Systems Negative.    MEDICATIONS  (STANDING):  aspirin enteric coated 81 milliGRAM(s) Oral daily  atorvastatin 80 milliGRAM(s) Oral at bedtime  eptifibatide Infusion 75 mg/100 mL 2 MICROgram(s)/kG/Min (12.8 mL/Hr) IV Continuous <Continuous>  metoprolol tartrate 12.5 milliGRAM(s) Oral two times a day  nicotine - 21 mG/24Hr(s) Patch 1 Patch Transdermal daily  sodium chloride 0.9% lock flush 3 milliLiter(s) IV Push every 8 hours  ticagrelor 90 milliGRAM(s) Oral two times a day    MEDICATIONS  (PRN):      OBJECTIVE:    Vital Signs Last 24 Hrs  T(C): 36.3 (14 Mar 2019 04:00), Max: 37 (13 Mar 2019 11:57)  T(F): 97.3 (14 Mar 2019 04:00), Max: 98.6 (13 Mar 2019 11:57)  HR: 62 (14 Mar 2019 07:00) (54 - 79)  BP: 110/51 (14 Mar 2019 07:00) (93/52 - 148/75)  BP(mean): 66 (14 Mar 2019 07:00) (60 - 97)  RR: 20 (14 Mar 2019 07:00) (16 - 21)  SpO2: 96% (14 Mar 2019 07:00) (95% - 98%)     CAPILLARY BLOOD GLUCOSE        I&O's Summary    13 Mar 2019 07:01  -  14 Mar 2019 07:00  --------------------------------------------------------  IN: 303.6 mL / OUT: 525 mL / NET: -221.4 mL        PHYSICAL EXAM:  GENERAL: NAD, well-developed  HEAD:  Atraumatic, Normocephalic  EYES: EOMI, PERRLA, conjunctiva and sclera clear  NECK: Supple, No JVD  CHEST/LUNG: Clear to auscultation bilaterally; No wheeze  HEART: Regular rate and rhythm; No murmurs, rubs, or gallops  ABDOMEN: Soft, Nontender, Nondistended; Bowel sounds present  EXTREMITIES:  2+ Peripheral Pulses, No clubbing, cyanosis, or edema  PSYCH: AAOx3  NEUROLOGY: non-focal  SKIN: No rashes or lesions    LABS:                        14.9   8.96  )-----------( 224      ( 14 Mar 2019 02:30 )             46.1     Auto Eosinophil # x     / Auto Eosinophil % x     / Auto Neutrophil # x     / Auto Neutrophil % x     / BANDS % x                            15.3   8.90  )-----------( 239      ( 13 Mar 2019 19:05 )             46.6     Auto Eosinophil # x     / Auto Eosinophil % x     / Auto Neutrophil # x     / Auto Neutrophil % x     / BANDS % x                            14.8   7.45  )-----------( 228      ( 13 Mar 2019 06:44 )             43.7     Auto Eosinophil # 0.21  / Auto Eosinophil % 2.8   / Auto Neutrophil # 3.35  / Auto Neutrophil % 45.0  / BANDS % x        03-14    141  |  103  |  17  ----------------------------<  95  4.6   |  25  |  0.96  03-13    138  |  110<H>  |  16  ----------------------------<  89  3.8   |  23  |  0.81  03-12    142  |  109<H>  |  22<H>  ----------------------------<  96  4.4   |  27  |  0.99    Ca    9.2      14 Mar 2019 02:30  Mg     2.2     03-14  Phos  3.8     03-14  TPro  6.8  /  Alb  4.0  /  TBili  0.5  /  DBili  x   /  AST  35<H>  /  ALT  16  /  AlkPhos  79  03-14  TPro  7.7  /  Alb  3.8  /  TBili  0.2  /  DBili  x   /  AST  19  /  ALT  25  /  AlkPhos  91  03-12    PT/INR - ( 12 Mar 2019 18:43 )   PT: 11.3 sec;   INR: 1.02 ratio         PTT - ( 12 Mar 2019 18:43 )  PTT:27.6 sec  CARDIAC MARKERS ( 14 Mar 2019 02:30 )  x     / x     / 278 u/L / 26.73 ng/mL / x      CARDIAC MARKERS ( 13 Mar 2019 19:05 )  x     / x     / 113 u/L / 8.00 ng/mL / x      CARDIAC MARKERS ( 13 Mar 2019 06:44 )  1.040 ng/mL / x     / 77 U/L / x     / 2.9 ng/mL  CARDIAC MARKERS ( 13 Mar 2019 00:56 )  1.130 ng/mL / x     / 76 U/L / x     / 2.6 ng/mL  CARDIAC MARKERS ( 12 Mar 2019 18:43 )  0.808 ng/mL / x     / x     / x     / x

## 2019-03-14 NOTE — PROGRESS NOTE ADULT - ATTENDING COMMENTS
Patient was seen and examined,interim events noted,labs and radiology studies reviewed.  Zach Salmeron MD,FACC.  [address] [place] [address].  [facility].  [phone]

## 2019-03-14 NOTE — PROGRESS NOTE ADULT - SUBJECTIVE AND OBJECTIVE BOX
=========================================  CONTACT INFO  Stephen Brito M.D., PGY-1  Pager: NS- [phone] [facility]- [phone]    Mon-Fri: pager covered by day team 7am-7pm;   ***Academic conferences 12pm-1pm- page ONLY if URGENT or if Consultant  /Carley: see chart, primary physician assigned available 7am-12pm  Sat/[place] Coverage 12pm-7pm: NS- page [phone] for Team1-4, [facility] - pager forwarded to covering Resident  For Night coverage 7pm-7am: NS- page [phone] Team1-3, page [phone] Team4 & Care Model; [facility]- page [phone] for Red, [phone] for Blue  =========================================    Patient is a 68y old  Female who presents with a chief complaint of chest pain (13 Mar 2019 13:51)      SUBJECTIVE / OVERNIGHT EVENTS:  Patient seen and examined at bedside. This morning, she is resting comfortably in bed and reports no new issues or overnight events.     She reports:  [  ] CP  [  ] SOB  [  ] Fever  [  ] N /  [  ] V  [  ] Diarrhea  [X] None of the above    Other Review of Systems Negative.    MEDICATIONS  (STANDING):  aspirin enteric coated 81 milliGRAM(s) Oral daily  atorvastatin 80 milliGRAM(s) Oral at bedtime  eptifibatide Infusion 75 mg/100 mL 2 MICROgram(s)/kG/Min (12.8 mL/Hr) IV Continuous <Continuous>  metoprolol tartrate 12.5 milliGRAM(s) Oral two times a day  nicotine - 21 mG/24Hr(s) Patch 1 Patch Transdermal daily  sodium chloride 0.9% lock flush 3 milliLiter(s) IV Push every 8 hours  ticagrelor 90 milliGRAM(s) Oral two times a day    MEDICATIONS  (PRN):      OBJECTIVE:    Vital Signs Last 24 Hrs  T(C): 36.3 (14 Mar 2019 04:00), Max: 37 (13 Mar 2019 11:57)  T(F): 97.3 (14 Mar 2019 04:00), Max: 98.6 (13 Mar 2019 11:57)  HR: 62 (14 Mar 2019 07:00) (54 - 79)  BP: 110/51 (14 Mar 2019 07:00) (93/52 - 148/75)  BP(mean): 66 (14 Mar 2019 07:00) (60 - 97)  RR: 20 (14 Mar 2019 07:00) (16 - 21)  SpO2: 96% (14 Mar 2019 07:00) (95% - 98%)     CAPILLARY BLOOD GLUCOSE        I&O's Summary    13 Mar 2019 07:01  -  14 Mar 2019 07:00  --------------------------------------------------------  IN: 303.6 mL / OUT: 525 mL / NET: -221.4 mL        PHYSICAL EXAM:  GENERAL: NAD, well-developed  HEAD:  Atraumatic, Normocephalic  EYES: EOMI, PERRLA, conjunctiva and sclera clear  NECK: Supple, No JVD  CHEST/LUNG: Clear to auscultation bilaterally; No wheeze  HEART: Regular rate and rhythm; No murmurs, rubs, or gallops  ABDOMEN: Soft, Nontender, Nondistended; Bowel sounds present  EXTREMITIES:  2+ Peripheral Pulses, No clubbing, cyanosis, or edema  PSYCH: AAOx3  NEUROLOGY: non-focal  SKIN: No rashes or lesions    LABS:                        14.9   8.96  )-----------( 224      ( 14 Mar 2019 02:30 )             46.1     Auto Eosinophil # x     / Auto Eosinophil % x     / Auto Neutrophil # x     / Auto Neutrophil % x     / BANDS % x                            15.3   8.90  )-----------( 239      ( 13 Mar 2019 19:05 )             46.6     Auto Eosinophil # x     / Auto Eosinophil % x     / Auto Neutrophil # x     / Auto Neutrophil % x     / BANDS % x                            14.8   7.45  )-----------( 228      ( 13 Mar 2019 06:44 )             43.7     Auto Eosinophil # 0.21  / Auto Eosinophil % 2.8   / Auto Neutrophil # 3.35  / Auto Neutrophil % 45.0  / BANDS % x        03-14    141  |  103  |  17  ----------------------------<  95  4.6   |  25  |  0.96  03-13    138  |  110<H>  |  16  ----------------------------<  89  3.8   |  23  |  0.81  03-12    142  |  109<H>  |  22<H>  ----------------------------<  96  4.4   |  27  |  0.99    Ca    9.2      14 Mar 2019 02:30  Mg     2.2     03-14  Phos  3.8     03-14  TPro  6.8  /  Alb  4.0  /  TBili  0.5  /  DBili  x   /  AST  35<H>  /  ALT  16  /  AlkPhos  79  03-14  TPro  7.7  /  Alb  3.8  /  TBili  0.2  /  DBili  x   /  AST  19  /  ALT  25  /  AlkPhos  91  03-12    PT/INR - ( 12 Mar 2019 18:43 )   PT: 11.3 sec;   INR: 1.02 ratio         PTT - ( 12 Mar 2019 18:43 )  PTT:27.6 sec  CARDIAC MARKERS ( 14 Mar 2019 02:30 )  x     / x     / 278 u/L / 26.73 ng/mL / x      CARDIAC MARKERS ( 13 Mar 2019 19:05 )  x     / x     / 113 u/L / 8.00 ng/mL / x      CARDIAC MARKERS ( 13 Mar 2019 06:44 )  1.040 ng/mL / x     / 77 U/L / x     / 2.9 ng/mL  CARDIAC MARKERS ( 13 Mar 2019 00:56 )  1.130 ng/mL / x     / 76 U/L / x     / 2.6 ng/mL  CARDIAC MARKERS ( 12 Mar 2019 18:43 )  0.808 ng/mL / x     / x     / x     / x                  ABG:     VBG:     Microbiology:        Care Discussed with Consultants/Other Providers:    RADIOLOGY & ADDITIONAL TESTS:  (Imaging Personally Reviewed)

## 2019-11-12 NOTE — PATIENT PROFILE ADULT - NSPROGENARRIVEDFROM_GEN_A_NUR
Patient verified name and . Order for consent not found in EHR and matches case posting; patient verifies procedure. Type 2 surgery, Phone assessment complete. Orders not received. Labs per surgeon: unknown  Labs per anesthesia protocol: hgb      Patient answered medical/surgical history questions at their best of ability. All prior to admission medications documented in [facility]. Patient instructed to take the following medications the day of surgery according to anesthesia guidelines with a small sip of water: Zofran prn, Zyrtec prn . Hold all vitamins 7 days prior to surgery and NSAIDS 5 days prior to surgery. Prescription meds to hold:- none    Patient instructed on the following:  Arrive at [facility], time of arrival to be called the day before by 1700  NPO after midnight including gum, mints, and ice chips  Responsible adult must drive patient to the hospital, stay during surgery, and patient will need supervision 24 hours after anesthesia  Use antibacterial soap in shower the night before surgery and on the morning of surgery  All piercings must be removed prior to arrival.    Leave all valuables (money and jewelry) at home but bring insurance card and ID on       DOS. Do not wear make-up, nail polish, lotions, cologne, perfumes, powders, or oil on skin. Patient teach back successful and patient demonstrates knowledge of instruction. home

## 2020-08-05 NOTE — DISCHARGE NOTE PROVIDER - NSDCHC_MEDRECSTATUS_GEN_ALL_CORE
amphetamine-dextroamphetamine (Adderall) 10 MG tablet [[id number]]  DISCONTINUED     Order Details   Dose: 10 mg Route: Oral Frequency: Daily With Lunch   Dispense Quantity: 30 tablet Refills: 0 Fills remaining: --           Sig: Take 1 tablet by mouth Daily With Lunch.        LOOKS LIKE IT HAS BEEN DISCONTINED, PT SAYS THAT THEY STILL TAKE THIS? IF THIS CANT BE REFILLED PLEASE CALL PT AT [phone]    [facility] #[id number] - [place], [place] - [address] AT [facility] [phone] [facility] [phone]     THANKS ИВАН   
Admission Reconciliation is Completed  Discharge Reconciliation is Completed

## 2021-07-23 NOTE — TRANSFER ACCEPTANCE NOTE - REASON FOR ADMISSION
Called patient for monthly CCM outreach, left message to call back.       Chart review - 4 min  Time with patient - 0 min  Total time - 4 min
chest pain

## 2022-12-21 NOTE — ED PROVIDER NOTE - NS PRO PASSIVE SMOKE EXP
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       Impression:   #hyperbilirubinemia - C/f cholangiocarcinoma: CT A/P non showing abnormal gallbladder, baldomero hepatis, with marked intrahepatic ductal dilatation and multiple lesions in the liver as well as thickening of the distal stomach, proximal duodenum, and enlargement of the pancreatic head and uncinate process. Currently in shock on levo, being followed by IR for possible percutaneous cholecystotomy tube drainage given lack of stability, however ongoing GOC with family, considering comfort measures    #Shock  #Acute renal failure   #Uterine CA s/p hysterectomy/chemo/RT      Recommendations  - GOC discussion  - f/u IR recs  - trend liver enzymes, c/w broad spectrum abx  - no planned endoscopic procedure  - rest of care per primary team    All recommendations are tentative until note is attested by attending.     Rupesh Jones, PGY6  Gastroenterology/Hepatology Fellow  During weekdays: Available on [employer] Teams or pager:[phone] ([facility] Short Range Pager); [phone] (Long Range Pager)  Overnight/Weekend: Please page the on-call GI fellow   No

## 2023-08-16 NOTE — PATIENT PROFILE ADULT - BRADEN SCORE
Notification Instructions: Patient will be notified of biopsy results. However, patient instructed to call the office if not contacted within 2 weeks. 20

## 2024-03-08 NOTE — PATIENT PROFILE ADULT - BRADEN ACTIVITY
below knee swelling, improved with daily use of compression stockings and elevation as tolerated  She is unable to exercise or walk normally on the left leg because of the severe pain in the left big toe since it was twisted, she is currently following with a podiatrist at an OSH  DVT ruled out    Review of Systems  As noted above  LLE Intermittent thigh/calf claudication pain. Better with standing, sitting or leaning forward- resolved after the intervention  Left hip pain shooting down to her legs  TOPETE/AS   Palpations/SVT   COPD/YOMAIRA   Arthritis  CTS   Neuropathy   No other complaints today     Past Medical History:   Diagnosis Date    CHF (congestive heart failure) (CMS/[facility])     Encounter for screening for infections with a predominantly sexual mode of transmission 07/17/2015    Screen for STD (sexually transmitted disease)    Hyperlipidemia     Hypertension     Mastodynia 04/27/2015    Breast pain, left    Other ventricular tachycardia (CMS/[facility]) 07/18/2019    NSVT (nonsustained ventricular tachycardia)    Personal history of other diseases of the female genital tract 07/23/2015    History of vaginal discharge    Personal history of other diseases of the nervous system and sense organs 07/17/2015    History of sciatica    Personal history of other diseases of the nervous system and sense organs 05/19/2015    History of earache    Personal history of other diseases of the respiratory system 04/27/2015    History of acute sinusitis    Personal history of other specified conditions 07/17/2015    History of urinary frequency    Unspecified cataract     Cataracts, both eyes     Past Surgical History:   Procedure Laterality Date    INVASIVE VASCULAR PROCEDURE Left  "11/15/2023    Procedure: Lower Extremity Angiogram;  Surgeon: Cyndie Addison MD;  Location: [facility] Cardiac Cath Lab;  Service: Cardiovascular;  Laterality: Left;    OTHER SURGICAL HISTORY  09/20/2019    Carpal tunnel surgery    OTHER SURGICAL HISTORY  09/20/2019    Hysteropexy    OTHER SURGICAL HISTORY  04/04/2019    Tubal ligation     Social History     Tobacco Use    Smoking status: Some Days     Packs/day: .25     Types: Cigarettes    Smokeless tobacco: Never   Substance Use Topics    Alcohol use: Never    Drug use: Never      Family History   Problem Relation Name Age of Onset    Coronary artery disease Mother      Diabetes Mother      Hypertension Mother      Diabetes Sister      Diabetes Brother      Diabetes Daughter        Allergies   Allergen Reactions    Penicillins Itching     Extreme Fatigue    Varenicline Hives     Memory Loss    Azithromycin Rash       Objective   Physical Exam  /71 (BP Location: Left arm, Patient Position: Sitting)   Pulse 75   Ht 1.575 m (5' 2\")   Wt 68 kg (150 lb)   BMI 27.44 kg/m²      General:  In no acute distress  Neuro: alert and oriented x3  CV:  RRR, +murmur   Lungs: CTA bilaterally  Abd:  Soft, non-tender   Psych:  Appropriate affect  Upper extremities: No swelling, +2 radial   Lower extremities: : RLE +2 DP/PT, LLE +2DP/PT, LLE swelling involving the dorsum of the foot-improved, tender to touch  Skin:  No open ulcers    Medications   Current Outpatient Medications   Medication Instructions    albuterol 0.63 mg/3 mL nebulizer solution inhalation    albuterol 90 mcg/actuation inhaler 2 puffs, inhalation, Every 4 hours PRN    alendronate (FOSAMAX) 70 mg, oral, Every 7 days    aspirin 81 mg, oral, Daily    atorvastatin (LIPITOR) 40 mg, oral, Nightly    budesonide-formoteroL (Symbicort) 160-4.5 mcg/actuation inhaler 2 puffs, inhalation, 2 times daily    buPROPion XL (WELLBUTRIN XL) 150 mg, oral, Every morning, Do not crush, chew, or split.    clopidogrel (PLAVIX) 75 mg, " "medicated     Easy Comfort Lancets 30 gauge misc     fexofenadine (Allegra Allergy) 180 mg tablet 1 tablet, oral, Daily    fluticasone (Flonase) 50 mcg/actuation nasal spray nasal    furosemide (Lasix) 20 mg tablet 1 tablet, oral, Every other day    hydrALAZINE (Apresoline) 25 mg tablet 1 tablet, oral, 3 times daily    insulin glargine (LANTUS SOLOSTAR U-100 INSULIN) 31 Units, subcutaneous, Nightly    Jardiance 25 mg 1 tablet, oral, Daily    lidocaine (Xylocaine) 5 % ointment APPLY A THIN LAYER (2 GRAMS) TOPICALLY TO AFFECTED AREA(S) THREE TIMES DAILY AS DIRECTED (1 GRAM=1 DIME SIZE)    lidocaine-prilocaine (Emla) 2.5-2.5 % cream     metFORMIN XR (GLUCOPHAGE-XR) 750 mg, oral, Daily, Hold for 48 hours post procedure    metoprolol succinate XL (Toprol-XL) 50 mg 24 hr tablet 1 tablet, oral, Daily    omega-3 acid ethyl esters (Lovaza) 1 gram capsule TAKE 2 CAPSULES BY MOUTH TWICE DAILY FOR TRIGLYCERIDES    OneTouch Ultra Test strip USE 1 STRIP THREE TIMES DAILY    OneTouch Ultra2 Meter misc     pen needle, diabetic 31 gauge x 5/16\" needle     pregabalin (LYRICA) 150 mg, oral, 2 times daily        Lab Review   Recent Labs     01/23/24  0822 10/16/23  0858 09/19/23  0900 07/14/23  1338 06/27/23  1320 06/13/23  0900 03/13/23  1007 02/23/23  0853    141 141 135* 141 140 139 139   K 4.4 4.5 4.3 4.2 4.5 5.0 5.2 4.7    107 106 102 105 107 104 105   CO2 22 23 27 25 25 24 28 24   ANIONGAP 16 16 12 12 16 14 12 15   BUN 17 19 15 16 20 22 32* 21   CREATININE 1.28* 1.05 1.00 1.00 1.09* 1.33* 1.26* 1.18*   EGFR 44* 56*  --   --   --   --   --   --      Recent Labs     01/23/24  0822 10/16/23  0858 07/14/23  1338 06/27/23  1320 06/13/23  0900 02/23/23  0853   ALBUMIN 4.3 " "07/22  0817 05/27/21  1815   WBC 6.0 7.4 6.8 7.1 5.7 7.2 7.8 6.5   HGB 10.0* 11.4* 11.7* 11.6* 11.6* 11.2* 11.5* 12.4   HCT 32.6* 38.8 38.1 39.3 38.0 37.0 35.9* 38.7    324 329 335 345 298 333 342   MCV 87 92 89 91 88 91 85 85     No results for input(s): \"PTT\", \"INR\", \"HAUF\", \"DDIMERVTE\", \"HAPTOGLOBIN\", \"FIBRINOGEN\" in the last 68546 hours.  PTT - No results in last year.  _   _ _     Recent Labs     06/13/23  0900 02/23/23  0853 02/07/23  1108   CHOL 114 113 122   LDLF 45 53 58   HDL 44.6 43.3 44.3   TRIG 120 84 100     Lab Results   Component Value Date    HGBA1C 6.8 (H) 01/23/2024     Lab Results   Component Value Date    TSH 1.18 06/13/2023     Imaging  LLE DVT US 12/12/23   Right Lower Venous: Right common femoral vein is negative for deep vein thrombus.  Left Lower Venous: No evidence of acute deep vein thrombus visualized in the left lower extremity.    LISSETH 1/2/2024   Compared with study from 9/5/2023, improvement in left ankle brachial index. No significant change on right, but now meets criteria for mild disease.     Invasive vascular procedure 11/15/2023  1. Status post successful IVUS_guided revascularization of LLE using 2 self-expanding stents [9 x 60 mm] in the left VIDA and [8 x 80 mm] in the left EIA. Blood pressure equalized between left CFA and aorta at the end of the case.  2. Continue aspirin 81 mg once daily, will switch Xarelto 2.5 twice daily to Plavix 75 daily for 6 months and switch back to Xarelto.  3. Follow-up in 4 weeks with repeat segmental LISSETH    Assessment/Plan   Geovanna Gilbert is a 75 y.o. female, with PMHx as noted. Following with  for PAD     PAD   -- C/o LLE Claudication pain limiting her activity/ability to participate at " 96/0.94/0.69   Abdominal/VIDA DUS with no clear evidence of stenosis -limited exam     LLE swelling   -- only started after she twisted her big toe, DVT ruled out.  Suspect ongoing swelling is secondary to abnormal gait/walking on the side of her foot because of the pain with subsequent decrease in calf muscle pump    Plan:  -- Continue compression stockings use during the day, leg elevation and exercise as tolerated/permitted by podiatry  -- Continue ASA 81 mg/day  -- Continue Plavix   -- Given poly-vascular disease, DM, GFR <60 - will resume rivaroxaban 2.5 mg BID in 6 months when plavix is dc as long as it is tolerated   -- Target BP <130/80. BP managed by her cardiologist   Continue high-intensity statins, Lipitor 40 mg/day. Target LDL <70  Optimize BG control. Continue follow up with PCP  Smoking cessation highly encouraged, will let me know if she is interested in smoking cessation programs  -- Pt will further discuss possible underlying neuropathy with her PCP ?spinal stenosis   -- Her blood pressure was noted to be high today,, asymptomatic, she will check it at home and let her PCP know if she is getting any symptoms or if it is persistently elevated  -- Rest as detailed in the patient's instructions     Sanjuanita Childs MD       (4) walks frequently

## 2024-09-03 NOTE — PATIENT PROFILE ADULT - NSTOBACCO QUIT READY_GEN_A_CORE_SD
What Type Of Note Output Would You Prefer (Optional)?: Standard Output How Severe Are Your Spot(S)?: moderate Have Your Spot(S) Been Treated In The Past?: has not been treated Hpi Title: Evaluation of Skin Lesions refuses to discuss

## 2025-06-18 NOTE — DISCHARGE NOTE PROVIDER - NSDCQMAMI_CARD_ALL_CORE
Patient's After Visit Summary was reviewed with patient.  Patient verbalized understanding of After Visit Summary, recommended follow up and was given an opportunity to ask questions.   Discharge medications sent home with patient/family: No   Discharged with other:   Pt medically cleared for discharge. All pt belongings sent with pt. WC transport to TCU.             No Yes...